# Patient Record
Sex: MALE | Race: WHITE | NOT HISPANIC OR LATINO
[De-identification: names, ages, dates, MRNs, and addresses within clinical notes are randomized per-mention and may not be internally consistent; named-entity substitution may affect disease eponyms.]

---

## 2018-09-16 ENCOUNTER — TRANSCRIPTION ENCOUNTER (OUTPATIENT)
Age: 53
End: 2018-09-16

## 2020-10-08 ENCOUNTER — OUTPATIENT (OUTPATIENT)
Dept: OUTPATIENT SERVICES | Facility: HOSPITAL | Age: 55
LOS: 1 days | End: 2020-10-08

## 2020-10-08 ENCOUNTER — EMERGENCY (EMERGENCY)
Facility: HOSPITAL | Age: 55
LOS: 1 days | End: 2020-10-08
Admitting: STUDENT IN AN ORGANIZED HEALTH CARE EDUCATION/TRAINING PROGRAM
Payer: COMMERCIAL

## 2020-10-08 PROCEDURE — 99285 EMERGENCY DEPT VISIT HI MDM: CPT

## 2020-10-08 PROCEDURE — 93010 ELECTROCARDIOGRAM REPORT: CPT

## 2020-10-08 PROCEDURE — 71045 X-RAY EXAM CHEST 1 VIEW: CPT | Mod: 26

## 2020-10-09 ENCOUNTER — OUTPATIENT (OUTPATIENT)
Dept: OUTPATIENT SERVICES | Facility: HOSPITAL | Age: 55
LOS: 1 days | End: 2020-10-09

## 2020-10-09 PROBLEM — Z00.00 ENCOUNTER FOR PREVENTIVE HEALTH EXAMINATION: Status: ACTIVE | Noted: 2020-10-09

## 2020-10-09 PROCEDURE — 99285 EMERGENCY DEPT VISIT HI MDM: CPT

## 2020-10-12 ENCOUNTER — APPOINTMENT (OUTPATIENT)
Dept: CARDIOLOGY | Facility: CLINIC | Age: 55
End: 2020-10-12
Payer: COMMERCIAL

## 2020-10-12 VITALS
SYSTOLIC BLOOD PRESSURE: 112 MMHG | WEIGHT: 215 LBS | BODY MASS INDEX: 26.73 KG/M2 | DIASTOLIC BLOOD PRESSURE: 84 MMHG | HEART RATE: 82 BPM | TEMPERATURE: 98.5 F | HEIGHT: 75 IN | OXYGEN SATURATION: 98 %

## 2020-10-12 DIAGNOSIS — Z78.9 OTHER SPECIFIED HEALTH STATUS: ICD-10-CM

## 2020-10-12 DIAGNOSIS — Z83.3 FAMILY HISTORY OF DIABETES MELLITUS: ICD-10-CM

## 2020-10-12 DIAGNOSIS — Z82.49 FAMILY HISTORY OF ISCHEMIC HEART DISEASE AND OTHER DISEASES OF THE CIRCULATORY SYSTEM: ICD-10-CM

## 2020-10-12 DIAGNOSIS — Z82.0 FAMILY HISTORY OF EPILEPSY AND OTHER DISEASES OF THE NERVOUS SYSTEM: ICD-10-CM

## 2020-10-12 DIAGNOSIS — Z83.438 FAMILY HISTORY OF OTHER DISORDER OF LIPOPROTEIN METABOLISM AND OTHER LIPIDEMIA: ICD-10-CM

## 2020-10-12 DIAGNOSIS — Z81.8 FAMILY HISTORY OF OTHER MENTAL AND BEHAVIORAL DISORDERS: ICD-10-CM

## 2020-10-12 PROCEDURE — 93306 TTE W/DOPPLER COMPLETE: CPT

## 2020-10-12 PROCEDURE — 99214 OFFICE O/P EST MOD 30 MIN: CPT

## 2020-10-12 NOTE — ASSESSMENT
[FreeTextEntry1] : Nando is a 55-year-old male with medical history detailed above and active medical issues including:\par \par -Recurrent chest pain concerning for angina.  [facility] admission 10/8/2020 nonischemic EKG, normal troponin.  Risks and options of cardiac catheterization have been discussed, including the risk of bleeding stroke heart attack.  Patient wishes to proceed and will be scheduled for catheterization within one week.  Aspirin and Plavix will be continued until catheterization.  Persistent chest pain patient will call 911 and go to the emergency room.\par \par - Borderline hypertension started on losartan.  Follow-up home BPs prior to dose and if SBP less than 110 discontinue.\par \par - Hyperlipidemia started on atorvastatin well-tolerated\par \par - History of nephrolithiasis with lithotripsy normal renal function on current labs\par \par Patient educated on lifestyle and diet modification with low sodium low fat diet and avoidance of excessive alcohol. Patient is aware to call with any symptoms or concerns.\par \par Cardiology follow-up after catheterization. \par \par Nando will follow-up with PCP for primary care\par

## 2020-10-12 NOTE — REASON FOR VISIT
[Follow-Up - Clinic] : a clinic follow-up of [FreeTextEntry2] : admission [facility] 10/9/2020 exertional chest pain normal labs nonischemic EKG discharge for cardiology follow-up [FreeTextEntry1] : Nando is a 55-year-old male with history of nephrolithiasis, lithotripsy, hyperlipidemia, borderline hypertension.\par \par [facility] [place] [facility] with recurrent exertional chest pain, burning moderate intensity with exertion.  Normal hospital evaluation including labs, nonischemic EKG discharge for outpatient cardiology follow-up.  Patient continues to have exertional chest pain similar character and intensity prior to admission.  Last chest pain earlier today. \par \par EKG 10/8/2020 sinus rhythm IRBBB\par \par Labs 10/8/2020 normal CBC, BMP, TSH, LFT, d-dimer 175, fasting cholesterol 233, triglyceride 158, HDL 37, , HbA1c 5.9, normal troponin x3.  Chest x-ray clear

## 2020-10-12 NOTE — PHYSICAL EXAM
[General Appearance - Well Developed] : well developed [Normal Appearance] : normal appearance [Well Groomed] : well groomed [General Appearance - Well Nourished] : well nourished [No Deformities] : no deformities [General Appearance - In No Acute Distress] : no acute distress [Normal Conjunctiva] : the conjunctiva exhibited no abnormalities [Eyelids - No Xanthelasma] : the eyelids demonstrated no xanthelasmas [Normal Oral Mucosa] : normal oral mucosa [No Oral Pallor] : no oral pallor [No Oral Cyanosis] : no oral cyanosis [Normal Jugular Venous A Waves Present] : normal jugular venous A waves present [Normal Jugular Venous V Waves Present] : normal jugular venous V waves present [No Jugular Venous Frazier A Waves] : no jugular venous frazier A waves [Heart Rate And Rhythm] : heart rate and rhythm were normal [Heart Sounds] : normal S1 and S2 [Murmurs] : no murmurs present [Respiration, Rhythm And Depth] : normal respiratory rhythm and effort [Exaggerated Use Of Accessory Muscles For Inspiration] : no accessory muscle use [Auscultation Breath Sounds / Voice Sounds] : lungs were clear to auscultation bilaterally [Abdomen Soft] : soft [Abdomen Tenderness] : non-tender [Abdomen Mass (___ Cm)] : no abdominal mass palpated [Abnormal Walk] : normal gait [Gait - Sufficient For Exercise Testing] : the gait was sufficient for exercise testing [Nail Clubbing] : no clubbing of the fingernails [Cyanosis, Localized] : no localized cyanosis [Petechial Hemorrhages (___cm)] : no petechial hemorrhages [Skin Color & Pigmentation] : normal skin color and pigmentation [] : no rash [No Venous Stasis] : no venous stasis [Skin Lesions] : no skin lesions [No Skin Ulcers] : no skin ulcer [No Xanthoma] : no  xanthoma was observed [Oriented To Time, Place, And Person] : oriented to person, place, and time [Affect] : the affect was normal [Mood] : the mood was normal [No Anxiety] : not feeling anxious

## 2020-10-13 ENCOUNTER — OUTPATIENT (OUTPATIENT)
Dept: OUTPATIENT SERVICES | Facility: HOSPITAL | Age: 55
LOS: 1 days | End: 2020-10-13
Payer: COMMERCIAL

## 2020-10-13 PROCEDURE — 71045 X-RAY EXAM CHEST 1 VIEW: CPT | Mod: 26

## 2020-10-13 PROCEDURE — 93010 ELECTROCARDIOGRAM REPORT: CPT | Mod: 76

## 2020-10-13 PROCEDURE — 99285 EMERGENCY DEPT VISIT HI MDM: CPT

## 2020-10-13 PROCEDURE — 93458 L HRT ARTERY/VENTRICLE ANGIO: CPT | Mod: 26,59

## 2020-10-13 PROCEDURE — 92928 PRQ TCAT PLMT NTRAC ST 1 LES: CPT | Mod: RC

## 2020-10-14 PROCEDURE — 93010 ELECTROCARDIOGRAM REPORT: CPT

## 2020-10-16 ENCOUNTER — APPOINTMENT (OUTPATIENT)
Dept: CARDIOLOGY | Facility: CLINIC | Age: 55
End: 2020-10-16
Payer: COMMERCIAL

## 2020-10-16 VITALS
HEART RATE: 73 BPM | SYSTOLIC BLOOD PRESSURE: 122 MMHG | WEIGHT: 210 LBS | OXYGEN SATURATION: 98 % | DIASTOLIC BLOOD PRESSURE: 84 MMHG | TEMPERATURE: 96.8 F | HEIGHT: 75 IN | BODY MASS INDEX: 26.11 KG/M2

## 2020-10-16 PROCEDURE — 99214 OFFICE O/P EST MOD 30 MIN: CPT

## 2020-10-19 ENCOUNTER — TRANSCRIPTION ENCOUNTER (OUTPATIENT)
Age: 55
End: 2020-10-19

## 2020-11-16 ENCOUNTER — APPOINTMENT (OUTPATIENT)
Dept: CARDIOLOGY | Facility: CLINIC | Age: 55
End: 2020-11-16
Payer: COMMERCIAL

## 2020-11-16 VITALS
WEIGHT: 205 LBS | HEIGHT: 75 IN | BODY MASS INDEX: 25.49 KG/M2 | SYSTOLIC BLOOD PRESSURE: 114 MMHG | TEMPERATURE: 98.1 F | OXYGEN SATURATION: 99 % | DIASTOLIC BLOOD PRESSURE: 70 MMHG | HEART RATE: 65 BPM

## 2020-11-16 DIAGNOSIS — Z95.5 PRESENCE OF CORONARY ANGIOPLASTY IMPLANT AND GRAFT: ICD-10-CM

## 2020-11-16 PROCEDURE — 99214 OFFICE O/P EST MOD 30 MIN: CPT

## 2020-11-16 PROCEDURE — 99072 ADDL SUPL MATRL&STAF TM PHE: CPT

## 2020-11-16 NOTE — ASSESSMENT
[FreeTextEntry1] : Nando is a 55-year-old male with medical history detailed above and active medical issues including:\par \par - Angina resolved after RL RCA 10/13/2020 on aspirin, Plavix, Lopressor, atorvastatin.  Discussed enrollment in cardiac rehab, patient will contact office if referral is needed. \par \par - HTN at guideline goal on losartan and Lopressor\par \par - Hyperlipidemia  on atorvastatin well-tolerated\par \par - History of nephrolithiasis with lithotripsy normal renal function on current labs\par \par Patient educated on lifestyle and diet modification with low sodium low fat diet and avoidance of excessive alcohol. Patient is aware to call with any symptoms or concerns.\par \par Cardiology follow-up 6 months.  Labs ordered.  Current cardiac medications remain unchanged.\par \par Nando will follow-up with PCP for primary care\par

## 2020-11-16 NOTE — REASON FOR VISIT
[Follow-Up - Clinic] : a clinic follow-up of [FreeTextEntry2] : RL RCA 10/13/2020, admission [facility] 10/9/2020 angina [FreeTextEntry1] : Nando is a 55-year-old male with history of nephrolithiasis, lithotripsy, hyperlipidemia, borderline hypertension.\par \par No further chest pain since PCI stent RCA 10/13/2020. [place] [facility] [place] with recurrent exertional chest pain, burning moderate intensity with exertion.  Normal hospital evaluation including labs, nonischemic EKG discharge for outpatient cardiology follow-up.  Patient continued to have exertional chest pain similar character and intensity prior to admission.  \par \par Cardiac catheterization 10/13/2020, LVEF 60%, left main, LAD, LCx nonobstructive, mid RCA 99% successful RL. \par \par Echocardiogram October 2020, LVEF 60%, mild diastolic dysfunction, mild MR and TR, normal RVSP\par \par EKG 10/8/2020 sinus rhythm IRBBB\par \par Labs 10/8/2020 normal CBC, BMP, TSH, LFT, d-dimer 175, fasting cholesterol 233, triglyceride 158, HDL 37, , HbA1c 5.9, normal troponin x3.  Chest x-ray clear

## 2020-11-16 NOTE — PHYSICAL EXAM
[General Appearance - Well Developed] : well developed [Normal Appearance] : normal appearance [Well Groomed] : well groomed [General Appearance - Well Nourished] : well nourished [No Deformities] : no deformities [General Appearance - In No Acute Distress] : no acute distress [Normal Conjunctiva] : the conjunctiva exhibited no abnormalities [Eyelids - No Xanthelasma] : the eyelids demonstrated no xanthelasmas [Normal Oral Mucosa] : normal oral mucosa [No Oral Pallor] : no oral pallor [No Oral Cyanosis] : no oral cyanosis [Normal Jugular Venous A Waves Present] : normal jugular venous A waves present [Normal Jugular Venous V Waves Present] : normal jugular venous V waves present [No Jugular Venous Frazier A Waves] : no jugular venous frazier A waves [Respiration, Rhythm And Depth] : normal respiratory rhythm and effort [Exaggerated Use Of Accessory Muscles For Inspiration] : no accessory muscle use [Auscultation Breath Sounds / Voice Sounds] : lungs were clear to auscultation bilaterally [Heart Rate And Rhythm] : heart rate and rhythm were normal [Heart Sounds] : normal S1 and S2 [Murmurs] : no murmurs present [Abdomen Soft] : soft [Abdomen Tenderness] : non-tender [Abdomen Mass (___ Cm)] : no abdominal mass palpated [Abnormal Walk] : normal gait [Gait - Sufficient For Exercise Testing] : the gait was sufficient for exercise testing [Nail Clubbing] : no clubbing of the fingernails [Cyanosis, Localized] : no localized cyanosis [Petechial Hemorrhages (___cm)] : no petechial hemorrhages [Skin Color & Pigmentation] : normal skin color and pigmentation [] : no rash [No Venous Stasis] : no venous stasis [Skin Lesions] : no skin lesions [No Skin Ulcers] : no skin ulcer [No Xanthoma] : no  xanthoma was observed [Oriented To Time, Place, And Person] : oriented to person, place, and time [Affect] : the affect was normal [Mood] : the mood was normal [No Anxiety] : not feeling anxious

## 2020-12-30 ENCOUNTER — APPOINTMENT (OUTPATIENT)
Dept: CARDIOLOGY | Facility: CLINIC | Age: 55
End: 2020-12-30
Payer: COMMERCIAL

## 2020-12-30 PROCEDURE — 99072 ADDL SUPL MATRL&STAF TM PHE: CPT

## 2020-12-30 PROCEDURE — 93015 CV STRESS TEST SUPVJ I&R: CPT

## 2021-03-10 ENCOUNTER — NON-APPOINTMENT (OUTPATIENT)
Age: 56
End: 2021-03-10

## 2021-03-31 ENCOUNTER — EMERGENCY (EMERGENCY)
Facility: HOSPITAL | Age: 56
LOS: 1 days | End: 2021-03-31
Admitting: EMERGENCY MEDICINE
Payer: COMMERCIAL

## 2021-03-31 PROCEDURE — 93971 EXTREMITY STUDY: CPT | Mod: 26,RT

## 2021-03-31 PROCEDURE — 99284 EMERGENCY DEPT VISIT MOD MDM: CPT

## 2021-03-31 PROCEDURE — 73610 X-RAY EXAM OF ANKLE: CPT | Mod: 26,RT

## 2021-05-05 ENCOUNTER — RX RENEWAL (OUTPATIENT)
Age: 56
End: 2021-05-05

## 2021-05-17 ENCOUNTER — APPOINTMENT (OUTPATIENT)
Dept: CARDIOLOGY | Facility: CLINIC | Age: 56
End: 2021-05-17

## 2021-07-12 ENCOUNTER — RX RENEWAL (OUTPATIENT)
Age: 56
End: 2021-07-12

## 2021-07-19 ENCOUNTER — APPOINTMENT (OUTPATIENT)
Dept: CARDIOLOGY | Facility: CLINIC | Age: 56
End: 2021-07-19
Payer: COMMERCIAL

## 2021-07-19 ENCOUNTER — NON-APPOINTMENT (OUTPATIENT)
Age: 56
End: 2021-07-19

## 2021-07-19 VITALS
SYSTOLIC BLOOD PRESSURE: 128 MMHG | BODY MASS INDEX: 26.73 KG/M2 | HEIGHT: 75 IN | OXYGEN SATURATION: 98 % | DIASTOLIC BLOOD PRESSURE: 84 MMHG | HEART RATE: 65 BPM | TEMPERATURE: 97.8 F | WEIGHT: 215 LBS

## 2021-07-19 PROCEDURE — 99214 OFFICE O/P EST MOD 30 MIN: CPT

## 2021-07-19 PROCEDURE — 99072 ADDL SUPL MATRL&STAF TM PHE: CPT

## 2021-07-19 RX ORDER — TADALAFIL 20 MG/1
20 TABLET ORAL
Qty: 30 | Refills: 0 | Status: ACTIVE | COMMUNITY
Start: 2020-10-15

## 2021-07-19 NOTE — ASSESSMENT
[FreeTextEntry1] : Nando is a 55-year-old male with medical history detailed above and active medical issues including:\par \par - Angina resolved after RL RCA 10/13/2020 on aspirin, Plavix, Lopressor, atorvastatin.  Discontinue Plavix Nov 2021 and continue long-term aspirin, atorvastatin and Lopressor\par \par - HTN at guideline goal on losartan and Lopressor\par \par - Hyperlipidemia  on atorvastatin well-tolerated\par \par - History of nephrolithiasis with lithotripsy normal renal function on current labs\par \par Patient educated on lifestyle and diet modification with low sodium low fat diet and avoidance of excessive alcohol. Patient is aware to call with any symptoms or concerns.\par \par Cardiology follow-up 6 months.  Labs ordered.  Current cardiac medications remain unchanged.\par \par Nando will follow-up with PCP for primary care\par

## 2021-07-19 NOTE — PHYSICAL EXAM
[General Appearance - Well Developed] : well developed [Normal Appearance] : normal appearance [Well Groomed] : well groomed [General Appearance - Well Nourished] : well nourished [No Deformities] : no deformities [General Appearance - In No Acute Distress] : no acute distress [Eyelids - No Xanthelasma] : the eyelids demonstrated no xanthelasmas [Normal Oral Mucosa] : normal oral mucosa [No Oral Pallor] : no oral pallor [No Oral Cyanosis] : no oral cyanosis [Normal Jugular Venous A Waves Present] : normal jugular venous A waves present [Normal Jugular Venous V Waves Present] : normal jugular venous V waves present [No Jugular Venous Frazier A Waves] : no jugular venous frazier A waves [Respiration, Rhythm And Depth] : normal respiratory rhythm and effort [Exaggerated Use Of Accessory Muscles For Inspiration] : no accessory muscle use [Auscultation Breath Sounds / Voice Sounds] : lungs were clear to auscultation bilaterally [Heart Rate And Rhythm] : heart rate and rhythm were normal [Heart Sounds] : normal S1 and S2 [Murmurs] : no murmurs present [Abdomen Soft] : soft [Abdomen Tenderness] : non-tender [Abdomen Mass (___ Cm)] : no abdominal mass palpated [Abnormal Walk] : normal gait [Gait - Sufficient For Exercise Testing] : the gait was sufficient for exercise testing [Nail Clubbing] : no clubbing of the fingernails [Cyanosis, Localized] : no localized cyanosis [Petechial Hemorrhages (___cm)] : no petechial hemorrhages [Skin Color & Pigmentation] : normal skin color and pigmentation [] : no rash [No Venous Stasis] : no venous stasis [Skin Lesions] : no skin lesions [No Skin Ulcers] : no skin ulcer [No Xanthoma] : no  xanthoma was observed [Oriented To Time, Place, And Person] : oriented to person, place, and time [Affect] : the affect was normal [Mood] : the mood was normal [No Anxiety] : not feeling anxious [Well Developed] : well developed [Well Nourished] : well nourished [No Acute Distress] : no acute distress [Normal Conjunctiva] : normal conjunctiva [Normal Venous Pressure] : normal venous pressure [No Carotid Bruit] : no carotid bruit [Normal S1, S2] : normal S1, S2 [No Murmur] : no murmur [No Rub] : no rub [No Gallop] : no gallop [Clear Lung Fields] : clear lung fields [Good Air Entry] : good air entry [No Respiratory Distress] : no respiratory distress  [Soft] : abdomen soft [Non Tender] : non-tender [No Masses/organomegaly] : no masses/organomegaly [Normal Bowel Sounds] : normal bowel sounds [Normal Gait] : normal gait [No Edema] : no edema [No Cyanosis] : no cyanosis [No Clubbing] : no clubbing [No Varicosities] : no varicosities [No Rash] : no rash [No Skin Lesions] : no skin lesions [Moves all extremities] : moves all extremities [No Focal Deficits] : no focal deficits [Normal Speech] : normal speech [Alert and Oriented] : alert and oriented [Normal memory] : normal memory

## 2021-07-19 NOTE — REASON FOR VISIT
[Other: ____] : [unfilled] [FreeTextEntry1] : Nando is a 55-year-old male with history of nephrolithiasis, lithotripsy, hyperlipidemia, borderline hypertension.\par \par Cardiovascular review of symptoms is negative for exertional chest pain, dyspnea, palpitations, dizziness or syncope.  No PND or orthopnea leg edema.  No bleeding or black stool.\par \par Patient is physically active playing tennis twice per week without exertional symptoms.\par \par No further chest pain since PCI stent RCA 10/13/2020. Patient admitted [place] [facility] [place]2020 with recurrent exertional chest pain, burning moderate intensity with exertion.  Normal hospital evaluation including labs, nonischemic EKG discharge for outpatient cardiology follow-up.  Patient continued to have exertional chest pain similar character and intensity prior to admission.  \par \par Cardiac catheterization 10/13/2020, LVEF 60%, left main, LAD, LCx nonobstructive, mid RCA 99% successful RL. \par \par Echocardiogram October 2020, LVEF 60%, mild diastolic dysfunction, mild MR and TR, normal RVSP\par \[facility] EKG 10/8/2020 sinus rhythm IRBBB\par \[facility] Labs 10/8/2020 normal CBC, BMP, TSH, LFT, d-dimer 175, fasting cholesterol 233, triglyceride 158, HDL 37, , HbA1c 5.9, normal troponin x3.  Chest x-ray clear

## 2021-10-08 ENCOUNTER — RX RENEWAL (OUTPATIENT)
Age: 56
End: 2021-10-08

## 2021-12-15 ENCOUNTER — NON-APPOINTMENT (OUTPATIENT)
Age: 56
End: 2021-12-15

## 2021-12-15 ENCOUNTER — APPOINTMENT (OUTPATIENT)
Dept: CARDIOLOGY | Facility: CLINIC | Age: 56
End: 2021-12-15
Payer: COMMERCIAL

## 2021-12-15 VITALS
TEMPERATURE: 97 F | DIASTOLIC BLOOD PRESSURE: 72 MMHG | HEART RATE: 62 BPM | SYSTOLIC BLOOD PRESSURE: 114 MMHG | HEIGHT: 75 IN | WEIGHT: 203 LBS | OXYGEN SATURATION: 98 % | BODY MASS INDEX: 25.24 KG/M2

## 2021-12-15 PROCEDURE — 99214 OFFICE O/P EST MOD 30 MIN: CPT

## 2021-12-15 RX ORDER — ASPIRIN 81 MG/1
81 TABLET ORAL DAILY
Refills: 0 | Status: DISCONTINUED | COMMUNITY
End: 2021-12-15

## 2021-12-15 NOTE — PHYSICAL EXAM
[Well Developed] : well developed [Well Nourished] : well nourished [No Acute Distress] : no acute distress [Normal Venous Pressure] : normal venous pressure [No Carotid Bruit] : no carotid bruit [Normal S1, S2] : normal S1, S2 [No Murmur] : no murmur [No Rub] : no rub [No Gallop] : no gallop [Clear Lung Fields] : clear lung fields [Good Air Entry] : good air entry [No Respiratory Distress] : no respiratory distress  [Soft] : abdomen soft [Non Tender] : non-tender [No Masses/organomegaly] : no masses/organomegaly [Normal Bowel Sounds] : normal bowel sounds [Normal Gait] : normal gait [No Edema] : no edema [No Cyanosis] : no cyanosis [No Clubbing] : no clubbing [No Varicosities] : no varicosities [No Rash] : no rash [No Skin Lesions] : no skin lesions [Moves all extremities] : moves all extremities [No Focal Deficits] : no focal deficits [Normal Speech] : normal speech [Alert and Oriented] : alert and oriented [Normal memory] : normal memory [General Appearance - Well Developed] : well developed [Normal Appearance] : normal appearance [Well Groomed] : well groomed [General Appearance - Well Nourished] : well nourished [No Deformities] : no deformities [General Appearance - In No Acute Distress] : no acute distress [Normal Conjunctiva] : the conjunctiva exhibited no abnormalities [Eyelids - No Xanthelasma] : the eyelids demonstrated no xanthelasmas [Normal Oral Mucosa] : normal oral mucosa [No Oral Pallor] : no oral pallor [No Oral Cyanosis] : no oral cyanosis [Normal Jugular Venous A Waves Present] : normal jugular venous A waves present [Normal Jugular Venous V Waves Present] : normal jugular venous V waves present [No Jugular Venous Frazier A Waves] : no jugular venous frazier A waves [Respiration, Rhythm And Depth] : normal respiratory rhythm and effort [Exaggerated Use Of Accessory Muscles For Inspiration] : no accessory muscle use [Auscultation Breath Sounds / Voice Sounds] : lungs were clear to auscultation bilaterally [Heart Rate And Rhythm] : heart rate and rhythm were normal [Heart Sounds] : normal S1 and S2 [Murmurs] : no murmurs present [Abdomen Soft] : soft [Abdomen Tenderness] : non-tender [Abdomen Mass (___ Cm)] : no abdominal mass palpated [Abnormal Walk] : normal gait [Gait - Sufficient For Exercise Testing] : the gait was sufficient for exercise testing [Nail Clubbing] : no clubbing of the fingernails [Cyanosis, Localized] : no localized cyanosis [Petechial Hemorrhages (___cm)] : no petechial hemorrhages [Skin Color & Pigmentation] : normal skin color and pigmentation [] : no rash [No Venous Stasis] : no venous stasis [Skin Lesions] : no skin lesions [No Skin Ulcers] : no skin ulcer [No Xanthoma] : no  xanthoma was observed [Oriented To Time, Place, And Person] : oriented to person, place, and time [Affect] : the affect was normal [Mood] : the mood was normal [No Anxiety] : not feeling anxious

## 2021-12-15 NOTE — REASON FOR VISIT
[Other: ____] : [unfilled] [FreeTextEntry1] : Nando is a 56-year-old male with history of nephrolithiasis, lithotripsy, hyperlipidemia, borderline hypertension.\par \par Patient has recurrent right upper chest pain, indigestion, once or twice per week over the past 2 months at rest.  Chest pain is lasting several hours, nonreproducible, nonradiating, moderate intensity.  Cardiovascular review of symptoms is negative for exertional chest pain, dyspnea, palpitations, dizziness or syncope.  No PND or orthopnea leg edema.  No bleeding or black stool.\par \par Patient is physically active playing tennis up to 2 hours without exertional symptoms\par \par [place] [facility] [place] with recurrent exertional chest pain, burning moderate intensity with exertion.  Normal hospital evaluation including labs, nonischemic EKG discharge for outpatient cardiology follow-up.  Patient continued to have exertional chest pain similar character and intensity prior to admission prompting cardiac cath.  \par \[facility] Cardiac catheterization 10/13/2020, LVEF 60%, left main, LAD, LCx nonobstructive, mid RCA 99% successful RL. \[facility] \[facility] Echocardiogram October 2020, LVEF 60%, mild diastolic dysfunction, mild MR and TR, normal RVSP\par \[facility] EKG 10/8/2020 sinus rhythm IRBBB\[facility] \[facility] Labs 10/8/2020 normal CBC, BMP, TSH, LFT, d-dimer 175, fasting cholesterol 233, triglyceride 158, HDL 37, , HbA1c 5.9, normal troponin x3.  Chest x-ray clear

## 2021-12-15 NOTE — ASSESSMENT
[FreeTextEntry1] : Nando is a 56-year-old male with medical history detailed above and active medical issues including:\par \par - Recurrent chest pain concerning for angina, multiple CAD risk factors.  Patient will have noninvasive testing with exercise stress echo to assess for obstructive CAD, HR and BP response, exercise-induced arrhythmia, echocardiogram for LVEF, structural heart disease, carotid and abdominal ultrasound to assess for obstructive PAD.  If persistent chest pain patient will call 911 and go to the nearest emergency room.\par \par - CAD, RL RCA 10/13/2020 on aspirin, Plavix, Lopressor, atorvastatin. \par \par - HTN average resting home BPs at guideline goal on losartan and Lopressor\par \par - Hyperlipidemia  on atorvastatin well-tolerated\par \par - History of nephrolithiasis with lithotripsy normal renal function on current labs\par \par - Erectile dysfunction on tadalafil as needed\par \par Patient educated on lifestyle and diet modification with low sodium low fat diet and avoidance of excessive alcohol. Patient is aware to call with any symptoms or concerns.\par \par Patient will be seen in cardiology follow-up after noninvasive testing.  Labs ordered.  Current cardiac medications remain unchanged.\par \par Nando will follow-up with PCP for primary care\par

## 2022-01-04 ENCOUNTER — APPOINTMENT (OUTPATIENT)
Dept: CARDIOLOGY | Facility: CLINIC | Age: 57
End: 2022-01-04
Payer: COMMERCIAL

## 2022-01-04 PROCEDURE — 93306 TTE W/DOPPLER COMPLETE: CPT

## 2022-01-04 PROCEDURE — 93979 VASCULAR STUDY: CPT

## 2022-01-04 PROCEDURE — 93880 EXTRACRANIAL BILAT STUDY: CPT

## 2022-01-17 ENCOUNTER — RX RENEWAL (OUTPATIENT)
Age: 57
End: 2022-01-17

## 2022-01-19 ENCOUNTER — APPOINTMENT (OUTPATIENT)
Dept: CARDIOLOGY | Facility: CLINIC | Age: 57
End: 2022-01-19

## 2022-01-27 ENCOUNTER — APPOINTMENT (OUTPATIENT)
Dept: CARDIOLOGY | Facility: CLINIC | Age: 57
End: 2022-01-27

## 2022-03-23 ENCOUNTER — APPOINTMENT (OUTPATIENT)
Dept: CARDIOLOGY | Facility: CLINIC | Age: 57
End: 2022-03-23
Payer: COMMERCIAL

## 2022-03-23 VITALS
HEIGHT: 75 IN | WEIGHT: 200 LBS | OXYGEN SATURATION: 100 % | DIASTOLIC BLOOD PRESSURE: 80 MMHG | BODY MASS INDEX: 24.87 KG/M2 | SYSTOLIC BLOOD PRESSURE: 120 MMHG | HEART RATE: 60 BPM

## 2022-03-23 DIAGNOSIS — I20.8 OTHER FORMS OF ANGINA PECTORIS: ICD-10-CM

## 2022-03-23 DIAGNOSIS — Z86.79 PERSONAL HISTORY OF OTHER DISEASES OF THE CIRCULATORY SYSTEM: ICD-10-CM

## 2022-03-23 PROCEDURE — 99214 OFFICE O/P EST MOD 30 MIN: CPT

## 2022-03-23 PROCEDURE — 93351 STRESS TTE COMPLETE: CPT

## 2022-03-23 RX ORDER — ASPIRIN 81 MG/1
81 TABLET, CHEWABLE ORAL
Qty: 90 | Refills: 0 | Status: DISCONTINUED | COMMUNITY
Start: 2020-10-13 | End: 2022-03-23

## 2022-03-23 NOTE — ASSESSMENT
[FreeTextEntry1] : Nando is a 56-year-old male with medical history detailed above and active medical issues including:\par \par - Atypical chest pain, reproducible R upper chest. Highly active w/o exertional symptoms. Stress echo done today normal EKG response, no symptoms, no evidence of inducible ischemia. Reviewed limitations of noninvasive testing and if any new or worsening symptoms should occur advised him to notify our office immediately for further evaluation. Otherwise cont med management and lifesyle prevention. \par \par - CAD, RL RCA 10/13/2020 on aspirin, Plavix, Lopressor, atorvastatin. Discussed risks, benefits, and alternatives of continued DAPT, now >1 yr post PCI will stop ASA to reduce bleeding risk and cont plavix rx. Cont statin, BB. \par \par - HTN average resting home BPs at guideline goal on losartan and Lopressor. Monitor BMP. Low salt diet. \par \par - Hyperlipidemia  on atorvastatin well-tolerated, last lipids reviewed LDL at goal <70. Cont current dosing. \par \par - History of nephrolithiasis with lithotripsy normal renal function on current labs.\par - Erectile dysfunction on tadalafil as needed\par Ongoing f/u PCP for noncardiac issues. \par \par Patient educated on lifestyle and diet modification with low sodium low fat diet and avoidance of excessive alcohol. Patient is aware to call with any symptoms or concerns.\par Recommend 6 month followup our office unless otherwise indicated. \par \par Sincerely,\par \par NICOLÁS Garcia\par Patients history, testing, and plan reviewed with supervising MD: Dr. Patrick Valentino \par

## 2022-03-23 NOTE — REASON FOR VISIT
[FreeTextEntry1] : Nando is a 56-year-old male with history of nephrolithiasis, lithotripsy, hyperlipidemia, borderline hypertension, CAD s/p RL RCA 10/2020. \par \par Patient has recurrent right upper chest pain, indigestion, once or twice per week over the past 2 months at rest.  Chest pain is lasting several hours, nonreproducible, nonradiating, moderate intensity.  Cardiovascular review of symptoms is negative for exertional chest pain, dyspnea, palpitations, dizziness or syncope.  No PND or orthopnea leg edema.  No bleeding or black stool.\par \par Patient is physically active playing tennis up to 2 hours without exertional symptoms\par \par Patient admitted to [facility] [place] [place] 10/8/2020 with recurrent exertional chest pain, burning moderate intensity with exertion.  Normal hospital evaluation including labs, nonischemic EKG discharge for outpatient cardiology follow-up.  Patient continued to have exertional chest pain similar character and intensity prior to admission prompting cardiac cath.  \par \par Echo 1/2022 normal LV systolic function mild MR, aortic root 4cm likely wnl for pt stature\par Carotid doppler 1/2022 mild nonobx atherosclerosis BL\par Stress echo today 3/23/22 >12min fran protocol with no symptoms, PVC couplet at peak, no ischemia by EKG, and no inducible ischemia by imaging. Normal CV response. \par \par Labs 7/2021 LDL 64, HDL 43, otherwise stable CMP, CBC\par \par Cardiac catheterization 10/13/2020, LVEF 60%, left main, LAD, LCx nonobstructive, mid RCA 99% successful RL. \par

## 2022-09-26 ENCOUNTER — APPOINTMENT (OUTPATIENT)
Dept: CARDIOLOGY | Facility: CLINIC | Age: 57
End: 2022-09-26

## 2023-03-29 ENCOUNTER — NON-APPOINTMENT (OUTPATIENT)
Age: 58
End: 2023-03-29

## 2023-04-13 NOTE — DISCUSSION/SUMMARY
[FreeTextEntry1] : I called patient left msg with lab result from Jan 2023,  prior LDL 64, patient on atorvastatin 40 Mg daily, dose increased to 80 Mg daily with repeat labs ordered.

## 2023-04-13 NOTE — REASON FOR VISIT
[Other: ____] : [unfilled] [FreeTextEntry1] : Nando is a 56-year-old male with history of nephrolithiasis, lithotripsy, hyperlipidemia, borderline hypertension.\par \par Patient has recurrent right upper chest pain, indigestion, once or twice per week over the past 2 months at rest.  Chest pain is lasting several hours, nonreproducible, nonradiating, moderate intensity.  Cardiovascular review of symptoms is negative for exertional chest pain, dyspnea, palpitations, dizziness or syncope.  No PND or orthopnea leg edema.  No bleeding or black stool.\par \par Patient is physically active playing tennis up to 2 hours without exertional symptoms\par \par [place] [facility] [place] exertional chest pain, burning moderate intensity with exertion.  Normal hospital evaluation including labs, nonischemic EKG discharge for outpatient cardiology follow-up.  Patient continued to have exertional chest pain similar character and intensity prior to admission prompting cardiac cath.  \par \[facility] Cardiac catheterization 10/13/2020, LVEF 60%, left main, LAD, LCx nonobstructive, mid RCA 99% successful RL. \[facility] \[facility] Echocardiogram October 2020, LVEF 60%, mild diastolic dysfunction, mild MR and TR, normal RVSP\par \[facility] EKG 10/8/2020 sinus rhythm IRBBB\[facility] \[facility] Labs 10/8/2020 normal CBC, BMP, TSH, LFT, d-dimer 175, fasting cholesterol 233, triglyceride 158, HDL 37, , HbA1c 5.9, normal troponin x3.  Chest x-ray clear

## 2023-04-20 ENCOUNTER — APPOINTMENT (OUTPATIENT)
Dept: CARDIOLOGY | Facility: CLINIC | Age: 58
End: 2023-04-20
Payer: COMMERCIAL

## 2023-05-11 NOTE — PHYSICAL EXAM
[Well Developed] : well developed [Well Nourished] : well nourished [No Acute Distress] : no acute distress [Normal Venous Pressure] : normal venous pressure [No Carotid Bruit] : no carotid bruit [Normal S1, S2] : normal S1, S2 [No Murmur] : no murmur [No Rub] : no rub [No Gallop] : no gallop [Clear Lung Fields] : clear lung fields [Good Air Entry] : good air entry [No Respiratory Distress] : no respiratory distress  [Soft] : abdomen soft [Non Tender] : non-tender [No Masses/organomegaly] : no masses/organomegaly [Normal Bowel Sounds] : normal bowel sounds [Normal Gait] : normal gait [No Edema] : no edema [No Cyanosis] : no cyanosis [No Clubbing] : no clubbing [No Varicosities] : no varicosities [No Rash] : no rash [No Skin Lesions] : no skin lesions [Moves all extremities] : moves all extremities [No Focal Deficits] : no focal deficits [Normal Speech] : normal speech [Alert and Oriented] : alert and oriented [Normal memory] : normal memory [General Appearance - Well Developed] : well developed [Normal Appearance] : normal appearance [Well Groomed] : well groomed [General Appearance - Well Nourished] : well nourished [No Deformities] : no deformities [General Appearance - In No Acute Distress] : no acute distress [Normal Conjunctiva] : the conjunctiva exhibited no abnormalities [Eyelids - No Xanthelasma] : the eyelids demonstrated no xanthelasmas [Normal Oral Mucosa] : normal oral mucosa [No Oral Pallor] : no oral pallor [No Oral Cyanosis] : no oral cyanosis [Normal Jugular Venous A Waves Present] : normal jugular venous A waves present [Normal Jugular Venous V Waves Present] : normal jugular venous V waves present [No Jugular Venous Frazier A Waves] : no jugular venous frazier A waves [Respiration, Rhythm And Depth] : normal respiratory rhythm and effort [Exaggerated Use Of Accessory Muscles For Inspiration] : no accessory muscle use [Auscultation Breath Sounds / Voice Sounds] : lungs were clear to auscultation bilaterally [Heart Rate And Rhythm] : heart rate and rhythm were normal [Heart Sounds] : normal S1 and S2 [Murmurs] : no murmurs present [Abdomen Soft] : soft [Abdomen Tenderness] : non-tender [Abdomen Mass (___ Cm)] : no abdominal mass palpated [Abnormal Walk] : normal gait [Gait - Sufficient For Exercise Testing] : the gait was sufficient for exercise testing [Nail Clubbing] : no clubbing of the fingernails [Petechial Hemorrhages (___cm)] : no petechial hemorrhages [Cyanosis, Localized] : no localized cyanosis [Skin Color & Pigmentation] : normal skin color and pigmentation [] : no rash [Skin Lesions] : no skin lesions [No Venous Stasis] : no venous stasis [No Skin Ulcers] : no skin ulcer [No Xanthoma] : no  xanthoma was observed [Oriented To Time, Place, And Person] : oriented to person, place, and time [Affect] : the affect was normal [Mood] : the mood was normal [No Anxiety] : not feeling anxious

## 2023-05-18 ENCOUNTER — APPOINTMENT (OUTPATIENT)
Dept: CARDIOLOGY | Facility: CLINIC | Age: 58
End: 2023-05-18
Payer: COMMERCIAL

## 2023-05-18 VITALS
BODY MASS INDEX: 25.36 KG/M2 | HEART RATE: 62 BPM | SYSTOLIC BLOOD PRESSURE: 110 MMHG | HEIGHT: 75 IN | DIASTOLIC BLOOD PRESSURE: 68 MMHG | OXYGEN SATURATION: 97 % | WEIGHT: 204 LBS

## 2023-05-18 PROCEDURE — 99214 OFFICE O/P EST MOD 30 MIN: CPT | Mod: 25

## 2023-05-18 PROCEDURE — 93000 ELECTROCARDIOGRAM COMPLETE: CPT

## 2023-05-18 RX ORDER — ATORVASTATIN CALCIUM 80 MG/1
80 TABLET, FILM COATED ORAL DAILY
Qty: 90 | Refills: 1 | Status: DISCONTINUED | COMMUNITY
Start: 2020-10-09 | End: 2023-05-18

## 2023-05-18 RX ORDER — ECONAZOLE NITRATE 10 MG/G
1 CREAM TOPICAL
Qty: 85 | Refills: 0 | Status: DISCONTINUED | COMMUNITY
Start: 2021-05-06 | End: 2023-05-18

## 2023-05-18 RX ORDER — CLOPIDOGREL BISULFATE 75 MG/1
75 TABLET, FILM COATED ORAL DAILY
Qty: 90 | Refills: 0 | Status: DISCONTINUED | COMMUNITY
Start: 2020-10-12 | End: 2023-05-18

## 2023-05-18 NOTE — ASSESSMENT
[FreeTextEntry1] : Nando is a 57-year-old male with medical history detailed above and active medical issues including:\par \par - Atypical chest pain resolved, normal exercise stress echo with normal LVEF March 2023\par \par - CAD, angina, RL RCA 10/13/2020 on aspirin, Lopressor, atorvastatin.  Plavix discontinued.\par \par - HTN average resting home BPs at guideline goal on losartan and Lopressor\par \par - Hyperlipidemia  on atorvastatin well-tolerated\par \par - History of nephrolithiasis with lithotripsy normal renal function on current labs\par \par - Erectile dysfunction on tadalafil as needed\par \par Patient educated on lifestyle and diet modification with low sodium low fat diet and avoidance of excessive alcohol. Patient is aware to call with any symptoms or concerns.\par \par Patient will have 2-D echocardiogram to assess LV systolic function, structural heart disease with telehealth follow-up..  Labs ordered. \par \par Nando will follow-up with PCP for primary care\par

## 2023-05-18 NOTE — REASON FOR VISIT
[Other: ____] : [unfilled] [FreeTextEntry1] : Nando is a 57-year-old male with history of nephrolithiasis, lithotripsy, hyperlipidemia, borderline hypertension.\par \par Atypical chest pain resolved.  Patient had recurrent right upper chest pain, indigestion, once or twice per week over 2 months at rest. Cardiovascular review of symptoms is negative for exertional chest pain, dyspnea, palpitations, dizziness or syncope.  No PND or orthopnea leg edema.  No bleeding or black stool.\par \par Patient is physically active playing tennis up to 2 hours without exertional symptoms\par \par [place] [facility] [place] with recurrent exertional chest pain, burning moderate intensity with exertion.  Normal hospital evaluation including labs, nonischemic EKG discharge for outpatient cardiology follow-up.  Patient continued to have exertional chest pain similar character and intensity prior to admission prompting cardiac cath.  \par \par Cardiac catheterization 10/13/2020, LVEF 60%, left main, LAD, LCx nonobstructive, mid RCA 99% successful RL. \par \[facility] Echocardiogram October 2020, LVEF 60%, mild diastolic dysfunction, mild MR and TR, normal RVSP\par \[facility] EKG 10/8/2020 sinus rhythm IRBBB\par \[facility] Labs 10/8/2020 normal CBC, BMP, TSH, LFT, d-dimer 175, fasting cholesterol 233, triglyceride 158, HDL 37, , HbA1c 5.9, normal troponin x3.  Chest x-ray clear

## 2023-06-12 PROBLEM — Q78.8: Status: ACTIVE | Noted: 2020-11-16

## 2023-06-12 NOTE — PHYSICAL EXAM
[Well Developed] : well developed [Well Nourished] : well nourished [No Acute Distress] : no acute distress [Normal Venous Pressure] : normal venous pressure [No Carotid Bruit] : no carotid bruit [Normal S1, S2] : normal S1, S2 [No Murmur] : no murmur [No Rub] : no rub [Clear Lung Fields] : clear lung fields [No Gallop] : no gallop [Good Air Entry] : good air entry [No Respiratory Distress] : no respiratory distress  [Soft] : abdomen soft [Non Tender] : non-tender [No Masses/organomegaly] : no masses/organomegaly [Normal Bowel Sounds] : normal bowel sounds [Normal Gait] : normal gait [No Edema] : no edema [No Cyanosis] : no cyanosis [No Clubbing] : no clubbing [No Varicosities] : no varicosities [No Rash] : no rash [No Skin Lesions] : no skin lesions [Moves all extremities] : moves all extremities [No Focal Deficits] : no focal deficits [Normal Speech] : normal speech [Alert and Oriented] : alert and oriented [Normal memory] : normal memory [General Appearance - Well Developed] : well developed [Normal Appearance] : normal appearance [Well Groomed] : well groomed [General Appearance - Well Nourished] : well nourished [No Deformities] : no deformities [Normal Conjunctiva] : the conjunctiva exhibited no abnormalities [General Appearance - In No Acute Distress] : no acute distress [Eyelids - No Xanthelasma] : the eyelids demonstrated no xanthelasmas [Normal Oral Mucosa] : normal oral mucosa [No Oral Pallor] : no oral pallor [No Oral Cyanosis] : no oral cyanosis [Normal Jugular Venous A Waves Present] : normal jugular venous A waves present [Normal Jugular Venous V Waves Present] : normal jugular venous V waves present [No Jugular Venous Frazier A Waves] : no jugular venous frazier A waves [Respiration, Rhythm And Depth] : normal respiratory rhythm and effort [Exaggerated Use Of Accessory Muscles For Inspiration] : no accessory muscle use [Auscultation Breath Sounds / Voice Sounds] : lungs were clear to auscultation bilaterally [Heart Rate And Rhythm] : heart rate and rhythm were normal [Heart Sounds] : normal S1 and S2 [Murmurs] : no murmurs present [Abdomen Soft] : soft [Abdomen Tenderness] : non-tender [Abdomen Mass (___ Cm)] : no abdominal mass palpated [Abnormal Walk] : normal gait [Gait - Sufficient For Exercise Testing] : the gait was sufficient for exercise testing [Nail Clubbing] : no clubbing of the fingernails [Cyanosis, Localized] : no localized cyanosis [Petechial Hemorrhages (___cm)] : no petechial hemorrhages [Skin Color & Pigmentation] : normal skin color and pigmentation [] : no rash [No Venous Stasis] : no venous stasis [Skin Lesions] : no skin lesions [No Skin Ulcers] : no skin ulcer [No Xanthoma] : no  xanthoma was observed [Oriented To Time, Place, And Person] : oriented to person, place, and time [Affect] : the affect was normal [Mood] : the mood was normal [No Anxiety] : not feeling anxious

## 2023-06-16 ENCOUNTER — APPOINTMENT (OUTPATIENT)
Dept: CARDIOLOGY | Facility: CLINIC | Age: 58
End: 2023-06-16
Payer: COMMERCIAL

## 2023-06-16 PROCEDURE — 93306 TTE W/DOPPLER COMPLETE: CPT

## 2023-06-19 ENCOUNTER — APPOINTMENT (OUTPATIENT)
Dept: CARDIOLOGY | Facility: CLINIC | Age: 58
End: 2023-06-19
Payer: COMMERCIAL

## 2023-06-19 DIAGNOSIS — Q78.8 OTHER SPECIFIED OSTEOCHONDRODYSPLASIAS: ICD-10-CM

## 2023-06-19 PROCEDURE — 99214 OFFICE O/P EST MOD 30 MIN: CPT | Mod: 95

## 2023-06-19 NOTE — ASSESSMENT
[FreeTextEntry1] : Nando is a 57-year-old male with medical history detailed above and active medical issues including:\par \par - Atypical chest pain resolved, normal exercise stress echo with normal LVEF March 2023\par \par - CAD, angina, RL RCA 10/13/2020 on aspirin, Lopressor, atorvastatin.  Plavix discontinued.\par \par - HTN average resting home BPs at guideline goal on losartan and Lopressor\par \par - Hyperlipidemia  on atorvastatin well-tolerated\par \par - History of nephrolithiasis with lithotripsy normal renal function on current labs\par \par - Erectile dysfunction on tadalafil as needed\par \par Patient educated on lifestyle and diet modification with low sodium low fat diet and avoidance of excessive alcohol. Patient is aware to call with any symptoms or concerns.\par \par Cardiology follow-up 6 months.  Current cardiac medications remain unchanged and renewals  are up to date. Repeat labs will be ordered with PMD.\par \par Nando will follow-up with PCP for primary care\par

## 2023-06-19 NOTE — REASON FOR VISIT
[Other: ____] : [unfilled] [FreeTextEntry1] : Nando is a 57-year-old male with history of nephrolithiasis, lithotripsy, hyperlipidemia, borderline hypertension.\par \par Atypical chest pain resolved.  Patient had recurrent right upper chest pain, indigestion, once or twice per week over 2 months at rest. Cardiovascular review of symptoms is negative for exertional chest pain, dyspnea, palpitations, dizziness or syncope.  No PND or orthopnea leg edema.  No bleeding or black stool.\par \par Patient is physically active playing tennis up to 2 hours without exertional symptoms\par \par [place] [facility] [place] with recurrent exertional chest pain, burning moderate intensity with exertion.  Normal hospital evaluation including labs, nonischemic EKG discharge for outpatient cardiology follow-up.  Patient continued to have exertional chest pain similar character and intensity prior to admission prompting cardiac cath.  \par \par Cardiac catheterization 10/13/2020, LVEF 60%, left main, LAD, LCx nonobstructive, mid RCA 99% successful RL. \par \[facility] Echocardiogram June 2023 LVEF 60 to 65%, mild MR and TR.\par \par Labs June 2023 normal ABC, BMP, LFT, TSH, total cholesterol 128, HDL 43, LDL 68, triglyceride 83, HbA1c 6.1\par \[facility] \[facility] Echocardiogram October 2020, LVEF 60%, mild diastolic dysfunction, mild MR and TR, normal RVSP\par \[facility] EKG 10/8/2020 sinus rhythm IRBBB\par \[facility] Labs 10/8/2020 normal CBC, BMP, TSH, LFT, d-dimer 175, fasting cholesterol 233, triglyceride 158, HDL 37, , HbA1c 5.9, normal troponin x3.  Chest x-ray clear

## 2023-07-23 ENCOUNTER — RX RENEWAL (OUTPATIENT)
Age: 58
End: 2023-07-23

## 2023-12-04 RX ORDER — ATORVASTATIN CALCIUM 80 MG/1
80 TABLET, FILM COATED ORAL
Qty: 90 | Refills: 3 | Status: ACTIVE | COMMUNITY
Start: 1900-01-01 | End: 1900-01-01

## 2024-05-06 PROBLEM — E78.5 HYPERLIPEMIA: Status: ACTIVE | Noted: 2020-10-12

## 2024-05-06 PROBLEM — M72.0 DUPUYTREN'S DISEASE OF PALM OF RIGHT HAND: Status: ACTIVE | Noted: 2020-11-16

## 2024-05-06 PROBLEM — I25.10 CAD IN NATIVE ARTERY: Status: ACTIVE | Noted: 2022-03-23

## 2024-05-06 PROBLEM — Z95.5 PRESENCE OF DRUG-ELUTING STENT IN RIGHT CORONARY ARTERY: Status: ACTIVE | Noted: 2020-11-16

## 2024-05-13 ENCOUNTER — APPOINTMENT (OUTPATIENT)
Dept: CARDIOLOGY | Facility: CLINIC | Age: 59
End: 2024-05-13
Payer: COMMERCIAL

## 2024-05-13 VITALS
SYSTOLIC BLOOD PRESSURE: 138 MMHG | HEIGHT: 75 IN | DIASTOLIC BLOOD PRESSURE: 80 MMHG | BODY MASS INDEX: 26.61 KG/M2 | HEART RATE: 65 BPM | WEIGHT: 214 LBS | OXYGEN SATURATION: 96 %

## 2024-05-13 DIAGNOSIS — I25.10 ATHEROSCLEROTIC HEART DISEASE OF NATIVE CORONARY ARTERY W/OUT ANGINA PECTORIS: ICD-10-CM

## 2024-05-13 DIAGNOSIS — I10 ESSENTIAL (PRIMARY) HYPERTENSION: ICD-10-CM

## 2024-05-13 DIAGNOSIS — M72.0 PALMAR FASCIAL FIBROMATOSIS [DUPUYTREN]: ICD-10-CM

## 2024-05-13 DIAGNOSIS — E78.5 HYPERLIPIDEMIA, UNSPECIFIED: ICD-10-CM

## 2024-05-13 DIAGNOSIS — R07.9 CHEST PAIN, UNSPECIFIED: ICD-10-CM

## 2024-05-13 DIAGNOSIS — Z95.5 PRESENCE OF CORONARY ANGIOPLASTY IMPLANT AND GRAFT: ICD-10-CM

## 2024-05-13 PROCEDURE — G2211 COMPLEX E/M VISIT ADD ON: CPT

## 2024-05-13 PROCEDURE — 99215 OFFICE O/P EST HI 40 MIN: CPT

## 2024-05-13 NOTE — ASSESSMENT
[FreeTextEntry1] : Patient has medical history detailed above and active medical issues including:  - Atypical chest pain resolved, normal exercise stress echo with normal LVEF March 2023  - CAD, angina, RL RCA 10/13/2020 on aspirin, Lopressor, atorvastatin.  Plavix discontinued.  - HTN average resting home BPs at guideline goal on losartan and Lopressor  - Hyperlipidemia  on atorvastatin well-tolerated  - History of nephrolithiasis with lithotripsy normal renal function on current labs  - Erectile dysfunction on tadalafil as needed  Patient educated on lifestyle and diet modification with low sodium low fat diet and avoidance of excessive alcohol. Patient is aware to call with any symptoms or concerns.  Cardiology follow-up 6 months with echocardiogram and Doppler studies.  Current cardiac medications remain unchanged and renewals  are up to date. Repeat labs will be ordered with [facility].  Nando will follow-up with Dr. Oneil for primary care  Total time spent 45 minutes, reviewing of test results, chart information, patient discussion, physical exam and completion of chart documentation.

## 2024-05-13 NOTE — REASON FOR VISIT
[Other: ____] : [unfilled] [FreeTextEntry1] : Nando is a 58-year-old male with history of nephrolithiasis, lithotripsy, hyperlipidemia, borderline hypertension.  Atypical chest pain resolved. Cardiovascular review of symptoms is negative for exertional chest pain, dyspnea, palpitations, dizziness or syncope.  No PND or orthopnea leg edema.  No bleeding or black stool.  Patient is physically active playing tennis and pickleball up to 2 hours without exertional symptoms  Patient [place] [facility] [place] with recurrent exertional chest pain, burning moderate intensity with exertion.  Normal hospital evaluation including labs, nonischemic EKG discharge for outpatient cardiology follow-up.  Patient continued to have exertional chest pain similar character and intensity prior to admission prompting cardiac cath.    Cardiac catheterization 10/13/2020, LVEF 60%, left main, LAD, LCx nonobstructive, mid RCA 99% successful RL.   Echocardiogram June 2023 LVEF 60 to 65%, mild MR and TR.  Labs June 2023 normal ABC, BMP, LFT, TSH, total cholesterol 128, HDL 43, LDL 68, triglyceride 83, HbA1c 6.1   Echocardiogram October 2020, LVEF 60%, mild diastolic dysfunction, mild MR and TR, normal RVSP  EKG 10/8/2020 sinus rhythm IRBBB  Labs 10/8/2020 normal CBC, BMP, TSH, LFT, d-dimer 175, fasting cholesterol 233, triglyceride 158, HDL 37, , HbA1c 5.9, normal troponin x3.  Chest x-ray clear

## 2024-05-30 LAB — HBA1C MFR BLD HPLC: 6.1

## 2024-06-25 ENCOUNTER — RX RENEWAL (OUTPATIENT)
Age: 59
End: 2024-06-25

## 2024-06-25 RX ORDER — METOPROLOL TARTRATE 25 MG/1
25 TABLET, FILM COATED ORAL
Qty: 45 | Refills: 3 | Status: ACTIVE | COMMUNITY
Start: 2020-10-16 | End: 1900-01-01

## 2024-06-25 RX ORDER — LOSARTAN POTASSIUM 25 MG/1
25 TABLET, FILM COATED ORAL
Qty: 90 | Refills: 3 | Status: ACTIVE | COMMUNITY
Start: 2020-10-09 | End: 1900-01-01

## 2024-11-05 ENCOUNTER — APPOINTMENT (OUTPATIENT)
Dept: CARDIOLOGY | Facility: CLINIC | Age: 59
End: 2024-11-05
Payer: COMMERCIAL

## 2024-11-05 PROCEDURE — 93978 VASCULAR STUDY: CPT

## 2024-11-05 PROCEDURE — 93880 EXTRACRANIAL BILAT STUDY: CPT

## 2024-11-05 PROCEDURE — 93306 TTE W/DOPPLER COMPLETE: CPT

## 2024-12-11 ENCOUNTER — APPOINTMENT (OUTPATIENT)
Dept: CARDIOLOGY | Facility: CLINIC | Age: 59
End: 2024-12-11

## 2024-12-11 VITALS
BODY MASS INDEX: 25.36 KG/M2 | DIASTOLIC BLOOD PRESSURE: 88 MMHG | SYSTOLIC BLOOD PRESSURE: 130 MMHG | RESPIRATION RATE: 14 BRPM | WEIGHT: 204 LBS | HEIGHT: 75 IN | OXYGEN SATURATION: 99 % | HEART RATE: 64 BPM

## 2024-12-11 DIAGNOSIS — E78.5 HYPERLIPIDEMIA, UNSPECIFIED: ICD-10-CM

## 2024-12-11 DIAGNOSIS — Z95.5 PRESENCE OF CORONARY ANGIOPLASTY IMPLANT AND GRAFT: ICD-10-CM

## 2024-12-11 DIAGNOSIS — I25.10 ATHEROSCLEROTIC HEART DISEASE OF NATIVE CORONARY ARTERY W/OUT ANGINA PECTORIS: ICD-10-CM

## 2024-12-11 DIAGNOSIS — I10 ESSENTIAL (PRIMARY) HYPERTENSION: ICD-10-CM

## 2024-12-11 PROCEDURE — 99214 OFFICE O/P EST MOD 30 MIN: CPT

## 2024-12-11 RX ORDER — ROSUVASTATIN CALCIUM 20 MG/1
20 TABLET, FILM COATED ORAL
Qty: 90 | Refills: 3 | Status: ACTIVE | COMMUNITY
Start: 2024-12-11 | End: 1900-01-01

## 2025-01-24 ENCOUNTER — LABORATORY RESULT (OUTPATIENT)
Age: 60
End: 2025-01-24

## 2025-04-17 ENCOUNTER — NON-APPOINTMENT (OUTPATIENT)
Age: 60
End: 2025-04-17

## 2025-04-21 ENCOUNTER — APPOINTMENT (OUTPATIENT)
Dept: CARDIOLOGY | Facility: CLINIC | Age: 60
End: 2025-04-21
Payer: COMMERCIAL

## 2025-04-21 VITALS
HEIGHT: 75 IN | WEIGHT: 208 LBS | OXYGEN SATURATION: 97 % | SYSTOLIC BLOOD PRESSURE: 126 MMHG | BODY MASS INDEX: 25.86 KG/M2 | DIASTOLIC BLOOD PRESSURE: 66 MMHG | HEART RATE: 62 BPM

## 2025-04-21 DIAGNOSIS — Z95.5 PRESENCE OF CORONARY ANGIOPLASTY IMPLANT AND GRAFT: ICD-10-CM

## 2025-04-21 DIAGNOSIS — R07.9 CHEST PAIN, UNSPECIFIED: ICD-10-CM

## 2025-04-21 DIAGNOSIS — M72.0 PALMAR FASCIAL FIBROMATOSIS [DUPUYTREN]: ICD-10-CM

## 2025-04-21 DIAGNOSIS — Q78.8 OTHER SPECIFIED OSTEOCHONDRODYSPLASIAS: ICD-10-CM

## 2025-04-21 DIAGNOSIS — I25.10 ATHEROSCLEROTIC HEART DISEASE OF NATIVE CORONARY ARTERY W/OUT ANGINA PECTORIS: ICD-10-CM

## 2025-04-21 DIAGNOSIS — E78.5 HYPERLIPIDEMIA, UNSPECIFIED: ICD-10-CM

## 2025-04-21 PROCEDURE — 99215 OFFICE O/P EST HI 40 MIN: CPT

## 2025-04-21 RX ORDER — ROSUVASTATIN CALCIUM 20 MG/1
20 TABLET, FILM COATED ORAL
Refills: 0 | Status: ACTIVE | COMMUNITY

## 2025-04-21 RX ORDER — TADALAFIL 20 MG/1
20 TABLET ORAL
Refills: 0 | Status: ACTIVE | COMMUNITY